# Patient Record
Sex: MALE | Race: WHITE
[De-identification: names, ages, dates, MRNs, and addresses within clinical notes are randomized per-mention and may not be internally consistent; named-entity substitution may affect disease eponyms.]

---

## 2017-12-20 ENCOUNTER — HOSPITAL ENCOUNTER (EMERGENCY)
Dept: HOSPITAL 74 - JER | Age: 57
Discharge: HOME | End: 2017-12-20
Payer: COMMERCIAL

## 2017-12-20 VITALS — SYSTOLIC BLOOD PRESSURE: 133 MMHG | DIASTOLIC BLOOD PRESSURE: 78 MMHG | HEART RATE: 80 BPM | TEMPERATURE: 98 F

## 2017-12-20 VITALS — BODY MASS INDEX: 23 KG/M2

## 2017-12-20 DIAGNOSIS — K21.9: ICD-10-CM

## 2017-12-20 DIAGNOSIS — F13.20: ICD-10-CM
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
## 2017-12-20 DIAGNOSIS — B19.20: ICD-10-CM
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
## 2017-12-20 DIAGNOSIS — S01.112A: Primary | ICD-10-CM

## 2017-12-20 DIAGNOSIS — S09.90XA: ICD-10-CM

## 2017-12-20 DIAGNOSIS — F11.20: ICD-10-CM

## 2017-12-20 PROCEDURE — 3E0234Z INTRODUCTION OF SERUM, TOXOID AND VACCINE INTO MUSCLE, PERCUTANEOUS APPROACH: ICD-10-PCS

## 2017-12-20 NOTE — PDOC
History of Present Illness





<Felisa Infante - Last Filed: 12/20/17 11:12>





- General


History Source: Patient


Exam Limitations: No Limitations





- History of Present Illness


Initial Comments: 


12/20/17 08:53


The patient is a 57 year old male, with a significant past medical history of 

Hep C, heroin use, emphysema, GERD, who presents to the emergency department s/

p fall today. Pt was leaving the methadone clinic and fell outside, sustaining 

a laceration to his left eyebrow and to the the lateral aspect of the left-side 

of his face. Pt is unsure when he received his last tetanus. He denies any 

blurred vision. He denies neck or back pain. Pt reports that he has been on 

methadone for 2 years for heroin addiction. He denies any heroin use today or 

alcohol use. 





Denies having any other injuries or symptoms.








<Vianey Zarco - Last Filed: 12/20/17 11:16>





- General


Chief Complaint: Injury


Stated Complaint: FALL


Time Seen by Provider: 12/20/17 07:48





Past History





- Past Medical History


Anemia: No


Asthma: No


Cancer: No


Cardiac Disorders: No


CVA: No


COPD: No


CHF: No


Dementia: No


Diabetes: No


GI Disorders: No


 Disorders: No


HTN: No


Hypercholesterolemia: No


Kidney Stones: No


Liver Disease: No


Seizures: No


Thyroid Disease: No





- Surgical History


Abdominal Surgery: No


Appendectomy: No


Cardiac Surgery: No


Cholecystectomy: No


Lung Surgery: No


Neurologic Surgery: No


Orthopedic Surgery: Yes (sugery for fx of left clavicle in 1999)





- Reproductive History


Testicular Surgery: No





- Suicide/Smoking/Psychosocial Hx


Smoking History: Current every day smoker


Have you smoked in the past 12 months: Yes


Number of Cigarettes Smoked Daily: 30


Cigars Per Day: 0


Information on smoking cessation initiated: No


'Breaking Loose' booklet given: 09/09/16


Hx Alcohol Use: No


Drug/Substance Use Hx: No (on methadone program)


Substance Use Type: Heroin


Hx Substance Use Treatment: Yes (daily use)





<Felisa Infante - Last Filed: 12/20/17 11:12>





<Vianey Zarco - Last Filed: 12/20/17 11:16>





- Past Medical History


Allergies/Adverse Reactions: 


 Allergies











Allergy/AdvReac Type Severity Reaction Status Date / Time


 


No Known Allergies Allergy   Verified 12/20/17 07:44











Home Medications: 


Ambulatory Orders





Methadone [Dolophine -] 50 mg PO DAILY 12/07/16 


Citalopram Hydrobromide [Celexa -] 10 mg PO DAILY 12/20/17 


Gabapentin [Neurontin -] 200 mg PO HS 12/20/17 


Trazodone HCl [Desyrel -] 100 mg PO HS 12/20/17 











**Review of Systems





- Review of Systems


Able to Perform ROS?: Yes


Comments:: 


12/20/17 08:54


GENERAL/CONSTITUTIONAL: No fever or chills. No weakness.


HEAD, EYES, EARS, NOSE AND THROAT: No change in vision. No ear pain or 

discharge. No sore throat.


CARDIOVASCULAR: No chest pain or shortness of breath.


RESPIRATORY: No cough, wheezing, or hemoptysis.


GASTROINTESTINAL: No nausea, vomiting, diarrhea or constipation.


GENITOURINARY: No dysuria, frequency, or change in urination.


MUSCULOSKELETAL: No joint or muscle swelling or pain. No neck or back pain.


SKIN: +laceration to left eyebrow and to the lateral aspect of the left-side of 

his face.


NEUROLOGIC: No headache, vertigo, loss of consciousness, or change in strength/

sensation.


ENDOCRINE: No increased thirst. No abnormal weight change.


HEMATOLOGIC/LYMPHATIC: No anemia, easy bleeding, or history of blood clots.


ALLERGIC/IMMUNOLOGIC: No hives or skin allergy.


    








<Vianey Zarco - Last Filed: 12/20/17 11:16>





*Physical Exam





- Vital Signs


 Last Vital Signs











Temp Pulse Resp BP Pulse Ox


 


 98.4 F   102 H  20   148/80   97 


 


 12/20/17 07:44  12/20/17 07:44  12/20/17 07:44  12/20/17 07:44  12/20/17 07:44














<Felisa Infante - Last Filed: 12/20/17 11:12>





- Vital Signs


 Last Vital Signs











Temp Pulse Resp BP Pulse Ox


 


 98.4 F   102 H  20   148/80   97 


 


 12/20/17 07:44  12/20/17 07:44  12/20/17 07:44  12/20/17 07:44  12/20/17 07:44














- Physical Exam


Comments: 


12/20/17 08:54


GENERAL: Awake, alert, and fully oriented, in no acute distress


HEAD: No signs of trauma


EYES: PERRLA, EOMI, sclera anicteric, conjunctiva clear


ENT: +Poor dentition but no broken teeth. Auricles normal inspection, hearing 

grossly normal, nares patent, oropharynx clear without 


exudates. Moist mucosa


NECK: Normal ROM, supple, no lymphadenopathy, JVD, or masses. No C spine 

tenderness. 


LUNGS: Breath sounds equal, clear to auscultation bilaterally.  No wheezes, and 

no crackles


HEART: Regular rate and rhythm, normal S1 and S2, no murmurs, rubs or gallops


ABDOMEN: Soft, nontender, normoactive bowel sounds.  No guarding, no rebound.  

No masses


EXTREMITIES: Normal range of motion, no edema.  No clubbing or cyanosis. No 

cords, erythema, or 


tenderness


NEUROLOGICAL: Cranial nerves II through XII grossly intact.  Normal speech, 

normal gait


SKIN: (+)Large laceration to the left eyebrow and to the lateral aspect of the 

left-side of his face, abrasion to LT side of cheek. No other external 

deformities other than his face. Warm, Dry, normal turgor.








<Vianey Zarco - Last Filed: 12/20/17 11:16>





**Heart Score/ECG Review





- ECG Intrepretation


Comment:: 





12/20/17 09:56


sinus at 69, L axis, RBBB, no acute st/t wave findings





<Felisa Infante - Last Filed: 12/20/17 11:12>





Medical Decision Making





- Medical Decision Making





12/20/17 09:40


56yo male s/p fall outside the methadone clinic


-closed head injury


-eye lacs


-will update tetanus


-never received narcan from EMS or the methadone facility


-will obtain head ct, c spine ct, facial ct





12/20/17 09:56


cts negative


now c/o chest wall pain


will check ekg and cxr





hx of benzo abuse - requesting detox. 


12/20/17 11:12


case discussed with Dr. Castle who accepts pt to detox for benzo use. pt 

agreeable to go to detox. 





<Felisa Infante - Last Filed: 12/20/17 11:12>





*DC/Admit/Observation/Transfer





- Discharge Dispostion


Admit: No





- Attestations


Physician Attestion: 





12/20/17 11:15








I, Dr. Felisa Infante DO, attest that this document has been prepared under 

my direction and personally reviewed by me in its entirety.   I further attest, 

that it accurately reflects all work, treatment, procedures and medical decision

-making performed by me.  





<Felisa Infante - Last Filed: 12/20/17 11:12>





- Attestations


Scribe Attestion: 


12/20/17 11:16





Documentation prepared by Vianey Zarco, acting as medical scribe for Felisa Infante DO, MD.





<Vianey Zarco - Last Filed: 12/20/17 11:16>


Diagnosis at time of Disposition: 


 Closed head injury, Facial laceration, Opioid dependence, Benzodiazepine 

dependence








- Discharge Dispostion


Disposition: HOME


Condition at time of disposition: Stable





- Referrals


Referrals: 


Marco Anglin MD [Staff Physician] - 





- Patient Instructions


Printed Discharge Instructions:  DI for Laceration Repair Steri-Strips, DI for 

Laceration Repair -- Simple, DI for Closed Head Injury


Additional Instructions: 


Please go directly to [facility] for detox. Security will take you. Please apply 

local wound care to the facial lacerations. Please return to the ED with any 

further concerns.

## 2017-12-20 NOTE — EKG
Test Reason : 

Blood Pressure : ***/*** mmHG

Vent. Rate : 069 BPM     Atrial Rate : 069 BPM

   P-R Int : 174 ms          QRS Dur : 136 ms

    QT Int : 422 ms       P-R-T Axes : 062 -56 029 degrees

   QTc Int : 452 ms

 

NORMAL SINUS RHYTHM

LEFT AXIS DEVIATION

RIGHT BUNDLE BRANCH BLOCK

ABNORMAL ECG

WHEN COMPARED WITH ECG OF 07-SEP-2017 06:59,

NO SIGNIFICANT CHANGE WAS FOUND

Confirmed by JERSEY STRONG, CLINT ([id number]) on 12/20/2017 11:06:45 AM

 

Referred By:             Confirmed By:CLINT PUTNAM MD

## 2017-12-21 ENCOUNTER — HOSPITAL ENCOUNTER (INPATIENT)
Dept: HOSPITAL 74 - YASAS | Age: 57
LOS: 1 days | Discharge: HOME | DRG: 897 | End: 2017-12-22
Attending: INTERNAL MEDICINE | Admitting: INTERNAL MEDICINE
Payer: COMMERCIAL

## 2017-12-21 VITALS — BODY MASS INDEX: 23.6 KG/M2

## 2017-12-21 DIAGNOSIS — Z91.19: ICD-10-CM

## 2017-12-21 DIAGNOSIS — F32.9: ICD-10-CM

## 2017-12-21 DIAGNOSIS — S01.81XS: ICD-10-CM

## 2017-12-21 DIAGNOSIS — Z99.89: ICD-10-CM

## 2017-12-21 DIAGNOSIS — R26.2: ICD-10-CM

## 2017-12-21 DIAGNOSIS — G40.909: ICD-10-CM
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
## 2017-12-21 DIAGNOSIS — L30.9: ICD-10-CM

## 2017-12-21 DIAGNOSIS — G56.21: ICD-10-CM

## 2017-12-21 DIAGNOSIS — K02.9: ICD-10-CM

## 2017-12-21 DIAGNOSIS — F11.20: ICD-10-CM

## 2017-12-21 DIAGNOSIS — F91.8: ICD-10-CM

## 2017-12-21 DIAGNOSIS — B18.2: ICD-10-CM

## 2017-12-21 DIAGNOSIS — J43.0: ICD-10-CM

## 2017-12-21 DIAGNOSIS — S09.90XS: ICD-10-CM

## 2017-12-21 DIAGNOSIS — F17.210: ICD-10-CM

## 2017-12-21 DIAGNOSIS — J45.32: ICD-10-CM

## 2017-12-21 DIAGNOSIS — F13.230: Primary | ICD-10-CM

## 2017-12-21 DIAGNOSIS — S70.02XS: ICD-10-CM

## 2017-12-21 DIAGNOSIS — I45.10: ICD-10-CM

## 2017-12-21 DIAGNOSIS — F19.24: ICD-10-CM

## 2017-12-21 DIAGNOSIS — F41.9: ICD-10-CM

## 2017-12-21 DIAGNOSIS — K21.9: ICD-10-CM

## 2017-12-21 LAB
ALBUMIN SERPL-MCNC: 4 G/DL (ref 3.4–5)
ALP SERPL-CCNC: 118 U/L (ref 45–117)
ALT SERPL-CCNC: 17 U/L (ref 12–78)
ANION GAP SERPL CALC-SCNC: 9 MMOL/L (ref 8–16)
APPEARANCE UR: (no result)
AST SERPL-CCNC: 20 U/L (ref 15–37)
BILIRUB SERPL-MCNC: 0.7 MG/DL (ref 0.2–1)
BILIRUB UR STRIP.AUTO-MCNC: NEGATIVE MG/DL
CALCIUM SERPL-MCNC: 9.4 MG/DL (ref 8.5–10.1)
CO2 SERPL-SCNC: 27 MMOL/L (ref 21–32)
COLOR UR: (no result)
CREAT SERPL-MCNC: 0.9 MG/DL (ref 0.7–1.3)
DEPRECATED RDW RBC AUTO: 15.9 % (ref 11.9–15.9)
GLUCOSE SERPL-MCNC: 145 MG/DL (ref 74–106)
HYALINE CASTS URNS QL MICRO: 3 /LPF
KETONES UR QL STRIP: (no result)
LEUKOCYTE ESTERASE UR QL STRIP.AUTO: NEGATIVE
MCH RBC QN AUTO: 25.1 PG (ref 25.7–33.7)
MCHC RBC AUTO-ENTMCNC: 32.1 G/DL (ref 32–35.9)
MCV RBC: 78.4 FL (ref 80–96)
MUCOUS THREADS URNS QL MICRO: (no result)
NITRITE UR QL STRIP: NEGATIVE
PH UR: 5 [PH] (ref 5–8)
PLATELET # BLD AUTO: 287 K/MM3 (ref 134–434)
PMV BLD: 8.9 FL (ref 7.5–11.1)
PROT SERPL-MCNC: 8.8 G/DL (ref 6.4–8.2)
PROT UR QL STRIP: (no result)
PROT UR QL STRIP: NEGATIVE
RBC # UR STRIP: NEGATIVE /UL
RBC #/AREA URNS HPF: 1 /HPF (ref 0–3)
SP GR UR: 1.02 (ref 1–1.03)
UROBILINOGEN UR STRIP-MCNC: NEGATIVE MG/DL (ref 0.2–1)
WBC # BLD AUTO: 12.9 K/MM3 (ref 4–10)
WBC #/AREA URNS HPF: 4 /HPF (ref 3–5)

## 2017-12-21 PROCEDURE — HZ2ZZZZ DETOXIFICATION SERVICES FOR SUBSTANCE ABUSE TREATMENT: ICD-10-PCS | Performed by: INTERNAL MEDICINE

## 2017-12-21 RX ADMIN — SULFAMETHOXAZOLE AND TRIMETHOPRIM SCH EACH: 800; 160 TABLET ORAL at 22:49

## 2017-12-21 RX ADMIN — SULFAMETHOXAZOLE AND TRIMETHOPRIM SCH EACH: 800; 160 TABLET ORAL at 14:48

## 2017-12-21 RX ADMIN — BACITRACIN ZINC SCH GM: 500 OINTMENT TOPICAL at 22:49

## 2017-12-21 RX ADMIN — BUDESONIDE AND FORMOTEROL FUMARATE DIHYDRATE SCH: 160; 4.5 AEROSOL RESPIRATORY (INHALATION) at 22:57

## 2017-12-21 RX ADMIN — NICOTINE SCH MG: 21 PATCH TRANSDERMAL at 12:41

## 2017-12-21 RX ADMIN — BACITRACIN ZINC SCH GM: 500 OINTMENT TOPICAL at 14:48

## 2017-12-21 NOTE — EKG
Test Reason : 

Blood Pressure : ***/*** mmHG

Vent. Rate : 082 BPM     Atrial Rate : 082 BPM

   P-R Int : 168 ms          QRS Dur : 142 ms

    QT Int : 428 ms       P-R-T Axes : 062 -51 028 degrees

   QTc Int : 500 ms

 

NORMAL SINUS RHYTHM

LEFT AXIS DEVIATION

RIGHT BUNDLE BRANCH BLOCK

INFERIOR INFARCT , AGE UNDETERMINED

ABNORMAL ECG

WHEN COMPARED WITH ECG OF 20-DEC-2017 09:41,

INFERIOR INFARCT IS NOW PRESENT

Confirmed by DYLAN STRONG, KAREN ([id number]) on 12/21/2017 4:33:41 PM

 

Referred By: SIDNEY HARTLEY           Confirmed By:KAREN RICHARDSON MD

## 2017-12-21 NOTE — HP
CIWA Score





- CIWA Score


Nausea/Vomitin-Mild Nausea/No Vomiting


Muscle Tremors: 4-Moderate,w/Arms Extend


Anxiety: 4-Mod. Anxious/Guarded


Agitation: 3


Paroxysmal Sweats: 2


Orientation: 2-Disoriented Date<2 days


Tacttile Disturbances: 2-Mild Itch/Numbness/Burn


Auditory Disturbances: 0-None


Visual Disturbances: 1-Very Mild Sensitivity


Headache: 2-Mild


CIWA-Ar Total Score: 21





Admission ROS S





- HPI


Chief Complaint: 





"I am spending too much money on my habit and I need to stop.'


Patient is here to Detox from Xanax.


Allergies/Adverse Reactions: 


 Allergies











Allergy/AdvReac Type Severity Reaction Status Date / Time


 


No Known Allergies Allergy   Verified 17 09:09











History of Present Illness: 





Patient is a 56 YO male here to Detox from Xanax. Patient has had several 

previous Detox admissions at [facility] in the past (last: ). Patient is a client 

at [facility] ([facility]): Daily Dose: 50 mg, Last Day Medicated: 

Today, 2017 - Verified.


Exam Limitations: No Limitations





- Ebola screening


Have you traveled outside of the country in the last 21 days: No (N)


Have you had contact with anyone from an Ebola affected area: No


Have you been sick,other than usual withdrawal symptoms: No


Do you have a fever: No





- Review of Systems


Constitutional: Chills, Diaphoresis, Fever, Loss of Appetite, Malaise, Night 

Sweats, Changes in sleep, Unintentional Wgt. Loss (Lost approx. 20 lbs. over 

last 1 year.)


EENT: reports: Blurred Vision (Near-sighted.), Hearing Loss, Tinnitus, Dental 

Problems (Cavities.)


Respiratory: reports: SOB with Exertion


Cardiac: reports: Palpitations


GI: reports: Nausea, Poor Appetite, Indigestion (Including Heartburn.)


: reports: No Symptoms Reported


Musculoskeletal: reports: Back Pain, Joint Pain, Muscle Pain, Neck Pain, Joint 

Stiffness


Integumentary: reports: Bruising (Area Surrounding Left Eye and Orbit. Patient 

unclear as to how injury occurred. However, patient's Methadone Counselor 

reports that patient had a seizure at [facility] yesterday and subsequently 

fell and hit head.), Erythema (Area Surrounding Left Eye and Orbit.)


Neuro: reports: Headache, Tremors


Endocrine: reports: No Symptoms Reported


Hematology: reports: No Symptoms Reported


Psychiatric: reports: Judgement Intact, Mood/Affect Appropiate, Anxious, 

Depressed (Took meds. in past. None currently.), Disorientated (To Current Day 

/ Date.)


Other Systems: Reviewed and Negative





Patient History





- Patient Medical History


Hx Anemia: No


Hx Asthma: No


Hx Chronic Obstructive Pulmonary Disease (COPD): Yes (Uncertain about type.)


Hx Cancer: No


Hx Cardiac Disorders: No


Hx Congestive Heart Failure: No


Hx Hypertension: No


Hx Hypercholesterolemia: No


Hx Pacemaker: No


HX Cerebrovascular Accident: No


Hx Seizures: Yes (2017)


Hx Dementia: No


Hx Diabetes: No


Hx Gastrointestinal Disorders: No


Hx Liver Disease: No


Hx Genitourinary Disorders: No


Hx Sexually Transmitted Disorders: No


Hx Renal Disease (ESRD): No


Hx Thyroid Disease: No


Hx Human Immunodeficiency Virus (HIV): No (Negative History.)


Hx Hepatitis C: Yes (Completed Treatment with last 2 years. Undetectable Viral 

Load.)


Hx Depression: Yes (Meds. in past, none currently.)


Hx Suicide Attempt: No (PATIENT DENIES CURRENT SI / HI.)


Hx Bipolar Disorder: No


Hx Schizophrenia: No


Other Medical History: DENIES.





- Patient Surgical History


Past Surgical History: Yes


Hx Neurologic Surgery: No


Hx Cataract Extraction: No


Hx Cardiac Surgery: No


Hx Lung Surgery: No


Hx Breast Surgery: No


Hx Breast Biopsy: No


Hx Abdominal Surgery: No


Hx Appendectomy: No


Hx Cholecystectomy: No


Hx Genitourinary Surgery: No


Hx  Section: No


Hx Orthopedic Surgery: Yes (sugery for fx of left clavicle in )


Other Surgical History: fx lt ankle-(fall); Six subsequent surgeries.


Anesthesia Reaction: No





- PPD History


Previous Implant?: Yes


Documented Results: Negative w/o proof


Implanted On Prior [facility] Admission?: Yes


Date: 08/22/15


Results: 0 mm


PPD to be Administered?: Yes





- Reproductive History


Patient is a Female of Child Bearing Age (11 -55 yrs old): No (PATIENT IS MALE.)





- Smoking Cessation


Smoking history: Current every day smoker


Have you smoked in the past 12 months: Yes


Aproximately how many cigarettes per day: 20


Cigars Per Day: 0


Hx Chewing Tobacco Use: No


Initiated information on smoking cessation: Yes


'Breaking Loose' booklet given: 17 (GIVEN ON UNIT.)





- Substance & Tx. History


Hx Alcohol Use: No


Hx Substance Use: Yes


Substance Use Type: Heroin, Opiates, Tranquilizers


Hx Substance Use Treatment: Yes (Previous Detox/Rehab admissions at [facility] (Last: 

).)





- Substances Abused


  ** Alprazolam (Xanax)


Route: Injection ("Skin-Popping")


Frequency: Daily


Amount used: 7-10 bags


Age of first use: 44


Date of Last Use: 17





  ** Heroin


Route: Injection


Frequency: Daily


Amount used: 1 gram


Age of first use: 53


Date of Last Use: 17





Family Disease History





- Family Disease History


Family Disease History: Other: Brother (dep on alcohol and heroin ), Son (

Committed Suicide.)





Admission Physical Exam [facility]





- Vital Signs


Vital Signs: 


 Vital Signs - 24 hr











  17





  09:17


 


Temperature 97.7 F


 


Pulse Rate 95 H


 


Respiratory 18





Rate 


 


Blood Pressure 141/90














- Physical


General Appearance: Yes: Nourished, Appropriately Dressed, Mild Distress, 

Tremorous, Anxious, Other (Patient ambulates with assistance of a cane.)


HEENTM: Yes: Hearing grossly Normal, Normocephalic, Normal Voice, ANJU, Pharynx 

Normal, Orbits (Erythema, swelling, stitches noted around Left Orbit. Patient 

evaluated in [facility] ER yesterday after fall due to seizure. No bleeding or 

unusual discahrge noted.)


Respiratory: Yes: Chest Non-Tender, Lungs Clear, No Respiratory Distress, No 

Accessory Muscle Use


Neck: Yes: No masses,lesions,Nodules, Supple, Trachea in good position


Breast: Yes: Breast Exam Deferred


Cardiology: Yes: Regular Rhythm, Regular Rate, S1, S2


Abdominal: Yes: Normal Bowel Sounds, Flat, Soft


Genitourinary: Yes: Within Normal Limits


Back: Yes: Decreased Range of Motion


Musculoskeletal: Yes: Gait Steady (With assistance of a cane.), Back pain, 

Joint Stiffness, Muscle Pain


Extremities: Yes: Tremors


Neurological: Yes: Alert, Normal Mood/Affect, Normal Response, Disoriented (To 

Current Day / Date.)


Integumentary: Yes: Normal Color, Dry, Warm, Track Marks (Noted on bilateral 

arms, erythema noted at affected sites.)


Lymphatic: Yes: Within Normal Limits





- Diagnostic


(1) Sedative, hypnotic or anxiolytic dependence with withdrawal, uncomplicated


Current Visit: Yes   Status: Acute   





(2) Opioid dependence on agonist therapy


Current Visit: Yes   Status: Chronic   





(3) Closed head injury


Current Visit: Yes   Status: Acute   


Qualifiers: 


   Encounter type: sequela   Qualified Code(s): S09.90XS - Unspecified injury 

of head, sequela   





(4) Facial laceration


Current Visit: Yes   Status: Acute   


Qualifiers: 


   Encounter type: sequela   Qualified Code(s): S01.81XS - Laceration without 

foreign body of other part of head, sequela   





(5) Methadone maintenance therapy patient


Current Visit: Yes   Status: Chronic   





(6) Hepatitis C


Current Visit: Yes   Status: Resolved   


Qualifiers: 


   Viral hepatitis chronicity: chronic   Hepatic coma status: without hepatic 

coma   Qualified Code(s): B18.2 - Chronic viral hepatitis C   





(7) Nicotine dependence


Current Visit: Yes   Status: Chronic   


Qualifiers: 


   Nicotine product type: cigarettes   Substance use status: uncomplicated   

Qualified Code(s): F17.210 - Nicotine dependence, cigarettes, uncomplicated   


Comment: Pt is not willing to stop or reduce tobacco use at this time   





Cleared for Admission [facility]





- Detox or Rehab


[facility] Level of Care: Medically Managed


Detox Regimen/Protocol: Valium





[facility] Breath Alcohol Content


Breath Alcohol Content: 0





Urine Drug Screen





- Results


Drug Screen Negative: No


Urine Drug Screen Results: OPI-Opiates, BZO-Benzodiazepines, MTD-Methadone, OXY-

Oxycodone

## 2017-12-21 NOTE — CONSULT
[facility] Psychiatric Consult





- Data


Date of interview: 12/21/17


Admission source: [facility]


Identifying data: This is 57 years old male with unclear psychiatric 

hospitalization history, intoxicated with:  Opioids, Methadome,. Xanax, Nicotine


Substance Abuse History: Smoking history: Current every day smoker.  Have you 

smoked in the past 12 months: Yes.  Aproximately how many cigarettes per day: 

20.  Cigars Per Day: 0.  Hx Chewing Tobacco Use: No.  Initiated information on 

smoking cessation: Yes.  'Breaking Loose' booklet given: 12/21/17 (GIVEN ON 

UNIT.).  - Substance & Tx. History.  Hx Alcohol Use: No.  Hx Substance Use: 

Yes.  Substance Use Type: Heroin, Opiates, Tranquilizers.  Hx Substance Use 

Treatment: Yes (Previous Detox/Rehab admissions at [facility] (Last: 2015).).  - 

Substances Abused.  ** Alprazolam (Xanax).  Route: Injection ("Skin-Popping").  

Frequency: Daily.  Amount used: 7-10 bags.  Age of first use: 44.  Date of Last 

Use: 12/20/17.  ** Heroin.  Route: Injection.  Frequency: Daily.  Amount used: 

1 gram.  Age of first use: 53.  Date of Last Use: 12/21/17


Medical History: [facility] 50mg /day, Head injury history, HepC+, facial lacerations

, left orbital hematoma, COPD, Emphusema, RBBB


Psychiatric History: Patient reports hisatory of depression, reports tasking 

prior to admission:  Celexa 10mg poqd


Physical/Sexual Abuse/Trauma History: Denies


Additional Comment: Celexa 10mg poqd





Mental Status Exam





- Mental Status Exam


Alert and Oriented to: Person


Cognitive Function: Fair


Patient Appearance: Unkempt


Mood: Anxious, Happy


Affect: Inappropriate


Patient Behavior: Cooperative


Speech Pattern: Excessive


Voice Loudness: Mildly Loud


Thought Process: Circumstantial


Thought Disorder: Being Controlled


Hallucinations: Denies


Suicidal Ideation: Denies


Homicidal Ideation: Denies


Insight/Judgement: Fair


Sleep: Fair


Appetite: Weight loss


Muscle strength/Tone: Normal


Gait/Station: Shuffling


Additional Comments: Celexa 10mg poqd





Psychiatric Findings





- Problem List (Axis 1, 2,3)


(1) Sedative, hypnotic or anxiolytic dependence with withdrawal, uncomplicated


Current Visit: Yes   Status: Acute   





(2) Methadone maintenance therapy patient


Current Visit: Yes   Status: Chronic   





(3) Nicotine dependence


Current Visit: Yes   Status: Chronic   


Qualifiers: 


   Nicotine product type: cigarettes   Substance use status: uncomplicated   

Qualified Code(s): F17.210 - Nicotine dependence, cigarettes, uncomplicated   


Comment: Pt is not willing to stop or reduce tobacco use at this time   





(4) Opioid dependence on agonist therapy


Current Visit: Yes   Status: Chronic   





(5) Benzodiazepine dependence


Current Visit: No   Status: Chronic   





(6) Substance induced mood disorder


Current Visit: No   Status: Chronic   Comment: Pt admits he should enter long-

term drug rehabilitation but he first needs to "take care of thingst"   





(7) Tobacco abuse


Current Visit: No   Status: Chronic   Comment: Discussed need to reduce tobacco 

use due to pneumonia and emphysema.  Pt admits need to stop but wants to wait. 

  





- Initial Treatment Plan


Initial Treatment Plan: Celexa 10mg poqd

## 2017-12-22 VITALS — SYSTOLIC BLOOD PRESSURE: 111 MMHG | HEART RATE: 95 BPM | TEMPERATURE: 98.1 F | DIASTOLIC BLOOD PRESSURE: 77 MMHG

## 2017-12-22 LAB
HIV 1 & 2 AB: NEGATIVE
HIV 1 AGP24: NEGATIVE

## 2017-12-22 RX ADMIN — IBUPROFEN PRN MG: 400 TABLET, FILM COATED ORAL at 10:17

## 2017-12-22 RX ADMIN — SULFAMETHOXAZOLE AND TRIMETHOPRIM SCH EACH: 800; 160 TABLET ORAL at 10:20

## 2017-12-22 RX ADMIN — NICOTINE SCH MG: 21 PATCH TRANSDERMAL at 10:18

## 2017-12-22 RX ADMIN — BUDESONIDE AND FORMOTEROL FUMARATE DIHYDRATE SCH INHALER: 160; 4.5 AEROSOL RESPIRATORY (INHALATION) at 10:18

## 2017-12-22 RX ADMIN — IBUPROFEN PRN MG: 400 TABLET, FILM COATED ORAL at 02:33

## 2017-12-22 RX ADMIN — BACITRACIN ZINC SCH GM: 500 OINTMENT TOPICAL at 10:18

## 2017-12-22 NOTE — DS
[facility] Detox Discharge Summary


Admission Date: 


12/21/17








- History


Present History: Sedative Dependence, [facility]





- Physical Exam Results


Vital Signs: 


 Vital Signs











Temperature  98.1 F   12/22/17 09:57


 


Pulse Rate  95 H  12/22/17 09:57


 


Respiratory Rate  20   12/22/17 09:57


 


Blood Pressure  111/77   12/22/17 09:57


 


O2 Sat by Pulse Oximetry (%)      














- Treatment


Hospital Course: Detox Protocol Followed, Detoxed Safely, Responded well, 

Discharged Condition Good, Rehab Referral Accepted





- Medication


Discharge Medications: 


Ambulatory Orders





Methadone [Dolophine -] 50 mg PO DAILY 12/07/16 


Citalopram Hydrobromide [Celexa -] 10 mg PO DAILY 12/20/17 


Gabapentin [Neurontin -] 200 mg PO HS 12/20/17 


Budesonide/Formeterol Fumarate [SYMBICORT 160/4.5mcg -] 1 inh PO BID 12/21/17 


Citalopram Hydrobromide [Celexa -] 10 mg PO DAILY #30 tablet 12/21/17 











- Diagnosis


(1) Closed head injury


Current Visit: Yes   Status: Acute   


Qualifiers: 


   Encounter type: initial encounter   Qualified Code(s): S09.90XA - 

Unspecified injury of head, initial encounter   





(2) Facial laceration


Current Visit: Yes   Status: Acute   


Qualifiers: 


   Encounter type: initial encounter   Qualified Code(s): S01.81XA - Laceration 

without foreign body of other part of head, initial encounter   





(3) Sedative, hypnotic or anxiolytic dependence with withdrawal, uncomplicated


Current Visit: Yes   Status: Chronic   





(4) Methadone maintenance therapy patient


Current Visit: Yes   Status: Chronic   





(5) Nicotine dependence


Current Visit: Yes   Status: Chronic   


Qualifiers: 


   Nicotine product type: cigarettes   Substance use status: uncomplicated   

Qualified Code(s): F17.210 - Nicotine dependence, cigarettes, uncomplicated   





(6) Opioid dependence on agonist therapy


Current Visit: Yes   Status: Chronic   





(7) Hepatitis C


Current Visit: Yes   Status: Resolved   


Qualifiers: 


   Viral hepatitis chronicity: chronic   Hepatic coma status: without hepatic 

coma   Qualified Code(s): B18.2 - Chronic viral hepatitis C   





(8) Dermatitis


Current Visit: No   Status: Acute   





(9) Rib contusion


Current Visit: Yes   Status: Chronic   





(10) Right bundle branch block


Current Visit: No   Status: Acute   





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


Current Visit: No   Status: Acute   





(12) Anxiety Disorder NOS


Current Visit: No   Status: Chronic   





(13) Asthma


Current Visit: No   Status: Chronic   


Qualifiers: 


   Asthma severity: mild persistent   Asthma complication type: with status 

asthmaticus   Qualified Code(s): J45.32 - Mild persistent asthma with status 

asthmaticus   





(14) COPD (chronic obstructive pulmonary disease)


Current Visit: No   Status: Chronic   


Qualifiers: 


   COPD type: emphysema   Emphysema type: unilateral   Qualified Code(s): J43.0 

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   





(15) Dental caries extending into dentin


Current Visit: No   Status: Chronic   





(16) Depression


Current Visit: No   Status: Chronic   





(17) GERD (gastroesophageal reflux disease)


Current Visit: No   Status: Chronic   


Qualifiers: 


   Esophagitis presence: without esophagitis   Qualified Code(s): K21.9 - Gastro

-esophageal reflux disease without esophagitis   





(18) Hepatitis C carrier


Current Visit: No   Status: Chronic   





(19) Substance induced mood disorder


Current Visit: No   Status: Chronic   





(20) Emphysema lung


Current Visit: No   Status: Suspected   





- AMA


Did Patient Leave Against Medical Advice: No (non compliant with unit rules/ 

threathening to staff)

## 2017-12-22 NOTE — PN
S CIWA





- CIWA Score


Nausea/Vomitin-No Nausea/No Vomiting


Muscle Tremors: 4-Moderate,w/Arms Extend


Anxiety: 4-Mod. Anxious/Guarded


Agitation: 3


Paroxysmal Sweats: 3


Orientation: 0-Oriented


Tacttile Disturbances: 0-None


Auditory Disturbances: 0-None


Visual Disturbances: 0-None


Headache: 0-None Present


CIWA-Ar Total Score: 14





BHS Progress Note (SOAP)


Subjective: 





anxiety


sweats


shakes


irritable


interrupted sleep


Objective: 





17 10:36


 Vital Signs











Temperature  98.1 F   17 09:57


 


Pulse Rate  95 H  17 09:57


 


Respiratory Rate  20   17 09:57


 


Blood Pressure  111/77   17 09:57


 


O2 Sat by Pulse Oximetry (%)      








 Laboratory Tests











  17





  10:15 12:15 12:15


 


WBC   12.9 H D 


 


RBC   5.48 


 


Hgb   13.8 


 


Hct   43.0 


 


MCV   78.4 L 


 


MCH   25.1 L 


 


MCHC   32.1 


 


RDW   15.9 


 


Plt Count   287  D 


 


MPV   8.9 


 


Sodium    135 L


 


Potassium    3.9


 


Chloride    99


 


Carbon Dioxide    27


 


Anion Gap    9


 


BUN    13


 


Creatinine    0.9


 


Creat Clearance w eGFR    > 60


 


Random Glucose    145 H D


 


Calcium    9.4


 


Total Bilirubin    0.7  D


 


AST    20  D


 


ALT    17  D


 


Alkaline Phosphatase    118 H


 


Total Protein    8.8 H


 


Albumin    4.0  D


 


Urine Color   


 


Urine Appearance   


 


Urine pH   


 


Ur Specific Gravity   


 


Urine Protein   


 


Urine Glucose (UA)   


 


Urine Ketones   


 


Urine Blood   


 


Urine Nitrite   


 


Urine Bilirubin   


 


Urine Urobilinogen   


 


Ur Leukocyte Esterase   


 


Urine WBC (Auto)   


 


Urine RBC (Auto)   


 


Ur Epithelial Cells   


 


Hyaline Casts   


 


Urine Mucus   


 


RPR Titer   


 


HIV 1&2 Antibody Screen  Negative  


 


HIV P24 Antigen  Negative  














  17





  12:15 15:15


 


WBC  


 


RBC  


 


Hgb  


 


Hct  


 


MCV  


 


MCH  


 


MCHC  


 


RDW  


 


Plt Count  


 


MPV  


 


Sodium  


 


Potassium  


 


Chloride  


 


Carbon Dioxide  


 


Anion Gap  


 


BUN  


 


Creatinine  


 


Creat Clearance w eGFR  


 


Random Glucose  


 


Calcium  


 


Total Bilirubin  


 


AST  


 


ALT  


 


Alkaline Phosphatase  


 


Total Protein  


 


Albumin  


 


Urine Color   Shanae


 


Urine Appearance   Cloudy


 


Urine pH   5.0


 


Ur Specific Gravity   1.025


 


Urine Protein   1+ H


 


Urine Glucose (UA)   Negative


 


Urine Ketones   Trace H


 


Urine Blood   Negative


 


Urine Nitrite   Negative


 


Urine Bilirubin   Negative


 


Urine Urobilinogen   Negative


 


Ur Leukocyte Esterase   Negative


 


Urine WBC (Auto)   4


 


Urine RBC (Auto)   1


 


Ur Epithelial Cells   Rare


 


Hyaline Casts   3


 


Urine Mucus   Many


 


RPR Titer  Nonreactive 


 


HIV 1&2 Antibody Screen  


 


HIV P24 Antigen  








aaox3


ambulating


no acute distress


Assessment: 





17 10:37


withdrawal sx


Plan: 





continue detox


increase fluids

## 2017-12-22 NOTE — PN
BHS Progress Note


Note: 


pt refused to comply by the unit rules, was insisting on more medication,pt was 

explained about the regimen but continue to become irrage. 


pt made a gesture with his finger representing a gun to shoot writer and RN, pt 

became irrate and threatening. security was called to the unit to have pt 

removed off the unit.

## 2018-09-11 ENCOUNTER — HOSPITAL ENCOUNTER (INPATIENT)
Dept: HOSPITAL 74 - YASAS | Age: 58
LOS: 4 days | Discharge: HOME | DRG: 897 | End: 2018-09-15
Attending: INTERNAL MEDICINE | Admitting: INTERNAL MEDICINE
Payer: COMMERCIAL

## 2018-09-11 VITALS — BODY MASS INDEX: 21.7 KG/M2

## 2018-09-11 DIAGNOSIS — F13.230: ICD-10-CM

## 2018-09-11 DIAGNOSIS — J45.32: ICD-10-CM

## 2018-09-11 DIAGNOSIS — G40.509: ICD-10-CM

## 2018-09-11 DIAGNOSIS — B18.2: ICD-10-CM

## 2018-09-11 DIAGNOSIS — F11.23: Primary | ICD-10-CM

## 2018-09-11 DIAGNOSIS — F17.213: ICD-10-CM

## 2018-09-11 DIAGNOSIS — K21.9: ICD-10-CM

## 2018-09-11 DIAGNOSIS — F32.9: ICD-10-CM

## 2018-09-11 DIAGNOSIS — F19.24: ICD-10-CM

## 2018-09-11 DIAGNOSIS — J43.0: ICD-10-CM

## 2018-09-11 LAB
APPEARANCE UR: (no result)
BILIRUB UR STRIP.AUTO-MCNC: NEGATIVE MG/DL
CAOX CRY URNS QL MICRO: (no result) /HPF
COLOR UR: YELLOW
KETONES UR QL STRIP: NEGATIVE
LEUKOCYTE ESTERASE UR QL STRIP.AUTO: NEGATIVE
MUCOUS THREADS URNS QL MICRO: (no result)
NITRITE UR QL STRIP: NEGATIVE
PH UR: 5 [PH] (ref 5–8)
PROT UR QL STRIP: NEGATIVE
PROT UR QL STRIP: NEGATIVE
SP GR UR: 1.02 (ref 1–1.03)
UROBILINOGEN UR STRIP-MCNC: NEGATIVE MG/DL (ref 0.2–1)

## 2018-09-11 PROCEDURE — HZ2ZZZZ DETOXIFICATION SERVICES FOR SUBSTANCE ABUSE TREATMENT: ICD-10-PCS | Performed by: INTERNAL MEDICINE

## 2018-09-11 RX ADMIN — NICOTINE SCH MG: 14 PATCH, EXTENDED RELEASE TRANSDERMAL at 21:30

## 2018-09-11 RX ADMIN — Medication SCH MG: at 21:32

## 2018-09-11 RX ADMIN — BUDESONIDE AND FORMOTEROL FUMARATE DIHYDRATE SCH PUFF: 160; 4.5 AEROSOL RESPIRATORY (INHALATION) at 21:31

## 2018-09-11 NOTE — HP
COWS





- Scale


Resting Pulse: 1= [place] 


Sweatin= Chills/Flushing


Restless Observation: 1= Difficult to Sit Still


Pupil Size: 1= Pupils >than Normal


Bone or Joint Aches: 1= Mild Discomfort


Runny Nose/ Eye Tearin= Runny Nose/Eyes


GI Upset > 30mins: 5=Frequent Vomit/Diarrhea


Tremor Observation: 1= Tremor Felt, Not Seen


Yawning Observation: 2= >3x During Session


Anxiety or Irritability: 2=Irritable/Anxious


Goose Flesh Skin: 0=Smooth Skin


COWS Score: 17





Admission ROS S





- HPI


Chief Complaint: 





opioid and benzodiazepine dependence 


Allergies/Adverse Reactions: 


 Allergies











Allergy/AdvReac Type Severity Reaction Status Date / Time


 


No Known Allergies Allergy   Verified 18 10:16











History of Present Illness: 





59 yo male with hx of intravenous heroin, klonopin and nicotine dependence is 

here seeking detox. Last detox [facility] 18 - administratively 

discharge. Patient was attending [facility] at [facility] reports he tried to self detox 

from methadone and opiates, reports his primary care provider started him on 

Buprenorphine on May 2018, reports no adherence with treatment. Reports he 

wanted get clean this past month, and attempted to do it "cold turkey" and was 

seen in the emergency department twice this month with last episode a week ago 

while he was in [place]. PMHX: COPD, asthma, Hep C, depression, anxiety. 

Denies suicidal / homicidal ideation. Reports history of withdrawal seizure 

about six months ago. 


Exam Limitations: No Limitations





- Ebola screening


Have you traveled outside of the country in the last 21 days: No


Have you had contact with anyone from an Ebola affected area: No


Have you been sick,other than usual withdrawal symptoms: No


Do you have a fever: No





- Review of Systems


Constitutional: Chills, Diaphoresis, Changes in sleep


EENT: reports: Blurred Vision (uses glasses), Dental Problems (missing teeth)


Respiratory: reports: SOB with Exertion, Other (SOB when off opioates)


Cardiac: reports: No Symptoms Reported


GI: reports: Diarrhea, Poor Appetite, Poor Fluid Intake, Vomiting, Abdominal 

cramping


: reports: No Symptoms Reported


Musculoskeletal: reports: Back Pain, Joint Pain, Muscle Pain


Integumentary: reports: No Symptoms Reported


Neuro: reports: See HPI, Dizziness


Endocrine: reports: Increased Thirst


Hematology: reports: No Symptoms Reported


Psychiatric: reports: Orientated x3, Anxious, Depressed (son commited sucide 2010)


Other Systems: Reviewed and Negative





Patient History





- Patient Medical History


Hx Anemia: No


Hx Asthma: Yes


Hx Chronic Obstructive Pulmonary Disease (COPD): Yes


Hx Cancer: No


Hx Cardiac Disorders: No


Hx Congestive Heart Failure: No


Hx Hypertension: No


Hx Hypercholesterolemia: No


Hx Pacemaker: No


HX Cerebrovascular Accident: No


Hx Seizures: Yes (drug related seizures-last episode was in 2018)


Hx Dementia: No


Hx Diabetes: No


Hx Gastrointestinal Disorders: No


Hx Liver Disease: No


Hx Genitourinary Disorders: No


Hx Sexually Transmitted Disorders: No


Hx Renal Disease (ESRD): No


Hx Thyroid Disease: No


Hx Human Immunodeficiency Virus (HIV): No (Negative History.)


Hx Hepatitis C: Yes (Completed Treatment with last 2 years. Undetectable Viral 

Load.)


Hx Depression: Yes


Hx Suicide Attempt: No


Hx Bipolar Disorder: No


Hx Schizophrenia: No





- Patient Surgical History


Past Surgical History: Yes


Hx Neurologic Surgery: No


Hx Cataract Extraction: No


Hx Cardiac Surgery: No


Hx Lung Surgery: No


Hx Breast Surgery: No


Hx Breast Biopsy: No


Hx Abdominal Surgery: No


Hx Appendectomy: No


Hx Cholecystectomy: No


Hx Genitourinary Surgery: No


Hx  Section: No


Hx Orthopedic Surgery: Yes (sugery for fx of left clavicle in )


Other Surgical History: fx lt ankle-(fall); Six subsequent surgeries.


Anesthesia Reaction: No





- PPD History


Previous Implant?: Yes


Documented Results: Negative w/proof


Implanted On Prior [facility] Admission?: Yes


Date: 08/22/15


Results: 0 mm


PPD to be Administered?: Yes





- Smoking Cessation


Smoking history: Current every day smoker


Have you smoked in the past 12 months: Yes


Aproximately how many cigarettes per day: 20


Cigars Per Day: 0


Hx Chewing Tobacco Use: No


Initiated information on smoking cessation: Yes


'Breaking Loose' booklet given: 18





- Substance & Tx. History


Hx Alcohol Use: Yes


Hx Substance Use: Yes


Substance Use Type: Heroin, Tranquilizers


Hx Substance Use Treatment: Yes (Last detox [facility] 18 - 

administratively discharge.)





- Substances Abused


  ** Heroin


Route: Injection


Frequency: Daily


Amount used: 2-3 bags


Age of first use: 44


Date of Last Use: 09/10/18





  ** Klonopin


Route: Oral


Frequency: 3-6 times per week


Amount used: 6-8 mg.


Age of first use: 56


Date of Last Use: 09/10/18





Family Disease History





- Family Disease History


Family Disease History: Other: Brother (dep on alcohol and heroin ), Son (

Committed Suicide.)





Admission Physical Exam BHS





- Vital Signs


Vital Signs: 


 Vital Signs - 24 hr











  18





  09:26


 


Temperature 97.4 F L


 


Pulse Rate 87


 


Respiratory 18





Rate 


 


Blood Pressure 115/66














- Physical


General Appearance: Yes: Disheveled, Moderate Distress, Thin, Sweating, Anxious


HEENTM: Yes: EOMI, Normal ENT Inspection, Normocephalic, Normal Voice, ANJU, 

Pharynx Normal, Hearing Decreased (bilateral), Nasal Congestion, Rhinorrhea, 

Other (cheilithis, poor dentition)


Respiratory: Yes: Chest Non-Tender, Lungs Clear, Normal Breath Sounds, No 

Respiratory Distress, No Accessory Muscle Use


Neck: Yes: Within Normal Limits


Breast: Yes: Breast Exam Deferred


Cardiology: Yes: Regular Rhythm, Regular Rate


Abdominal: Yes: Normal Bowel Sounds, Non Tender, Flat, Soft


Genitourinary: Yes: Within Normal Limits


Back: Yes: Normal Inspection


Musculoskeletal: Yes: full range of Motion, Gait Steady, Pelvis Stable


Extremities: Yes: Normal Capillary Refill, Normal Inspection, Normal Range of 

Motion


Neurological: Yes: CNs II-XII NML intact, Fully Oriented, Alert, Motor Strength 

5/5


Integumentary: Yes: Normal Color, Warm, Diaphoresis, Track Marks (bilateral 

forearms no infection)


Lymphatic: Yes: Within Normal Limits





- Diagnostic


(1) Opioid dependence with withdrawal


Current Visit: Yes   Status: Acute   





(2) Depression


Current Visit: Yes   Status: Suspected   


Qualifiers: 


   Depression Type: unspecified   Qualified Code(s): F32.9 - Major depressive 

disorder, single episode, unspecified   





(3) GERD (gastroesophageal reflux disease)


Current Visit: Yes   Status: Chronic   


Qualifiers: 


   Esophagitis presence: without esophagitis   Qualified Code(s): K21.9 - Gastro

-esophageal reflux disease without esophagitis   





(4) Hepatitis C carrier


Current Visit: No   Status: Chronic   Comment: draw labs today, Patient 

education, RTC in 4 weeks   





(5) Nicotine dependence


Current Visit: Yes   Status: Chronic   


Qualifiers: 


   Nicotine product type: cigarettes   Substance use status: uncomplicated   

Qualified Code(s): F17.210 - Nicotine dependence, cigarettes, uncomplicated   


Comment: Pt is not willing to stop or reduce tobacco use at this time   





(6) Sedative, hypnotic or anxiolytic dependence with withdrawal, uncomplicated


Current Visit: Yes   Status: Chronic   





Cleared for Admission [facility]





- Detox or Rehab


[facility] Level of Care: Medically Managed


Detox Regimen/Protocol: Methadone/Valium





[facility] Breath Alcohol Content


Breath Alcohol Content: 0





Urine Drug Screen





- Results


Drug Screen Negative: No


Urine Drug Screen Results: OPI-Opiates, BZO-Benzodiazepines, OXY-Oxycodone

## 2018-09-12 LAB
ALBUMIN SERPL-MCNC: 2.9 G/DL (ref 3.4–5)
ALP SERPL-CCNC: 77 U/L (ref 45–117)
ALT SERPL-CCNC: 18 U/L (ref 12–78)
ANION GAP SERPL CALC-SCNC: 9 MMOL/L (ref 8–16)
AST SERPL-CCNC: 13 U/L (ref 15–37)
BILIRUB SERPL-MCNC: 0.2 MG/DL (ref 0.2–1)
BUN SERPL-MCNC: 11 MG/DL (ref 7–18)
CALCIUM SERPL-MCNC: 7.9 MG/DL (ref 8.5–10.1)
CHLORIDE SERPL-SCNC: 102 MMOL/L (ref 98–107)
CO2 SERPL-SCNC: 28 MMOL/L (ref 21–32)
CREAT SERPL-MCNC: 0.6 MG/DL (ref 0.7–1.3)
DEPRECATED RDW RBC AUTO: 14.8 % (ref 11.9–15.9)
GLUCOSE SERPL-MCNC: 116 MG/DL (ref 74–106)
HCT VFR BLD CALC: 36.2 % (ref 35.4–49)
HGB BLD-MCNC: 12.1 GM/DL (ref 11.7–16.9)
MCH RBC QN AUTO: 27.3 PG (ref 25.7–33.7)
MCHC RBC AUTO-ENTMCNC: 33.4 G/DL (ref 32–35.9)
MCV RBC: 81.9 FL (ref 80–96)
PLATELET # BLD AUTO: 185 K/MM3 (ref 134–434)
PMV BLD: 9.1 FL (ref 7.5–11.1)
POTASSIUM SERPLBLD-SCNC: 3.4 MMOL/L (ref 3.5–5.1)
PROT SERPL-MCNC: 5.6 G/DL (ref 6.4–8.2)
RBC # BLD AUTO: 4.42 M/MM3 (ref 4–5.6)
SODIUM SERPL-SCNC: 139 MMOL/L (ref 136–145)
WBC # BLD AUTO: 8.5 K/MM3 (ref 4–10)

## 2018-09-12 RX ADMIN — POTASSIUM CHLORIDE SCH: 1500 TABLET, EXTENDED RELEASE ORAL at 14:52

## 2018-09-12 RX ADMIN — Medication PRN MG: at 22:27

## 2018-09-12 RX ADMIN — NICOTINE SCH: 14 PATCH, EXTENDED RELEASE TRANSDERMAL at 10:50

## 2018-09-12 RX ADMIN — BUDESONIDE AND FORMOTEROL FUMARATE DIHYDRATE SCH PUFF: 160; 4.5 AEROSOL RESPIRATORY (INHALATION) at 22:28

## 2018-09-12 RX ADMIN — Medication SCH MG: at 22:26

## 2018-09-12 RX ADMIN — Medication SCH TAB: at 10:10

## 2018-09-12 RX ADMIN — NICOTINE SCH MG: 21 PATCH TRANSDERMAL at 10:10

## 2018-09-12 RX ADMIN — BUDESONIDE AND FORMOTEROL FUMARATE DIHYDRATE SCH PUFF: 160; 4.5 AEROSOL RESPIRATORY (INHALATION) at 10:09

## 2018-09-12 NOTE — PN
BHS CIWA





- CIWA Score


Nausea/Vomitin-Mild Nausea/No Vomiting


Muscle Tremors: 2


Anxiety: 3


Agitation: 3


Paroxysmal Sweats: 3


Orientation: 0-Oriented


Tacttile Disturbances: 1-Very Mild Itch/Numbness


Auditory Disturbances: 0-None


Visual Disturbances: 1-Very Mild Sensitivity


Headache: 0-None Present


CIWA-Ar Total Score: 14

## 2018-09-12 NOTE — CONSULT
BHS Psychiatric Consult





- Data


Date of interview: 09/12/18


Admission source: [facility]


Identifying data: Readmission to [facility] for this 57 y/o  male self-

referred for detoxification treatment (opioid,benzodiazepines).Admitted to [address] [place] [address].Patient is ,a father of four,homeless,unemployed (disabled) and 

supported on SSI benefits.


Substance Abuse History: Discussed in this session.Patient confirms an 

extensive history of xanax + heroin abuse.Details in current BHS report : 

Smoking history: Current every day smoker.  Have you smoked in the past 12 

months: Yes.  Aproximately how many cigarettes per day: 20.  Cigars Per Day: 0.

  Hx Chewing Tobacco Use: No.  Initiated information on smoking cessation: Yes.

  'Breaking Loose' booklet given: 09/11/18.  - Substance & Tx. History.  Hx 

Alcohol Use: Yes.  Hx Substance Use: Yes.  Substance Use Type: Heroin, 

Tranquilizers.  Hx Substance Use Treatment: Yes (Last detox [facility] 12/21/18 -12 / 22/18 administratively discharge.).  - Substances Abused.  ** Heroin.  Route: 

Injection.  Frequency: Daily.  Amount used: 2-3 bags.  Age of first use: 44.  

Date of Last Use: 09/10/18.  ** Klonopin.  Route: Oral.  Frequency: 3-6 times 

per week.  Amount used: 6-8 mg.  Age of first use: 56.  Date of Last Use: 09/10/

18


Medical History: Remarkable for Hepatitis C,emphysema,COPD,GERD and 

orthosurgery (fracture of left clavicle + left ankle).


Psychiatric History: Patient denies history of psychiatric hospitalizations or 

suicide attempts.Declares that he sees a psychiatrist at [facility] and the [facility] outpatient 

clinic in the [place] to address depression and anxiety.Used to be prescribed 

celexa + gabapentin + xanax. Mr Seng states that he has stopped taking these 

medications (except for xanax that he purchases in the streets) for the past 

four months.


Physical/Sexual Abuse/Trauma History: Severe history of emotional trauma : one 

son committed suicide in 2010.


Additional Comment: Urine Drug Screen Results: OPI-Opiates, BZO-Benzodiazepines

, OXY-Oxycodone.Noted.





Mental Status Exam





- Mental Status Exam


Alert and Oriented to: Time, Place, Person


Cognitive Function: Grossly Intact


Patient Appearance: Unkempt, Disheveled (unshaven,poor oral hygiene,mising teeth

)


Mood: Angry, Withdrawn, Apprehensive, Irritable


Affect: Mood Congruent


Patient Behavior: Fatigued, Cooperative


Speech Pattern: Clear


Voice Loudness: Normal


Thought Process: Goal Oriented


Thought Disorder: Not Present


Hallucinations: Denies


Suicidal Ideation: Denies


Homicidal Ideation: Denies


Insight/Judgement: Poor


Sleep: Well


Appetite: Fair


Muscle strength/Tone: Normal


Gait/Station: Normal





Psychiatric Findings





- Problem List (Axis 1, 2,3)


(1) Opioid dependence with withdrawal


Current Visit: Yes   Status: Acute   





(2) Sedative, hypnotic or anxiolytic dependence with withdrawal, uncomplicated


Current Visit: Yes   Status: Acute   





(3) Nicotine dependence


Current Visit: Yes   Status: Acute   


Qualifiers: 


   Nicotine product type: cigarettes   Substance use status: in withdrawal   

Qualified Code(s): F17.213 - Nicotine dependence, cigarettes, with withdrawal   


Comment: Pt is not willing to stop or reduce tobacco use at this time   





(4) Substance induced mood disorder


Current Visit: Yes   Status: Chronic   





- Initial Treatment Plan


Initial Treatment Plan: Psychoeducation.Sleep hygiene.Detoxification in 

progress.Referred to counselor for assessment of social needs + arrangements 

for transition to rehabilitation care. Observation.

## 2018-09-12 NOTE — EKG
Test Reason : 

Blood Pressure : ***/*** mmHG

Vent. Rate : 082 BPM     Atrial Rate : 082 BPM

   P-R Int : 162 ms          QRS Dur : 134 ms

    QT Int : 382 ms       P-R-T Axes : 068 -45 063 degrees

   QTc Int : 446 ms

 

NORMAL SINUS RHYTHM

LEFT AXIS DEVIATION

RIGHT BUNDLE BRANCH BLOCK

ABNORMAL ECG

WHEN COMPARED WITH ECG OF 22-DEC-2017 06:54,

NO SIGNIFICANT CHANGE WAS FOUND

Confirmed by JERSEY STRONG, CLINT ([id number]) on 9/12/2018 11:44:48 AM

 

Referred By:             Confirmed By:CLINT PUTNAM MD

## 2018-09-13 RX ADMIN — BUDESONIDE AND FORMOTEROL FUMARATE DIHYDRATE SCH PUFF: 160; 4.5 AEROSOL RESPIRATORY (INHALATION) at 10:19

## 2018-09-13 RX ADMIN — METHADONE HYDROCHLORIDE SCH MG: 5 TABLET ORAL at 10:19

## 2018-09-13 RX ADMIN — NICOTINE SCH: 14 PATCH, EXTENDED RELEASE TRANSDERMAL at 10:20

## 2018-09-13 RX ADMIN — Medication SCH TAB: at 10:19

## 2018-09-13 RX ADMIN — Medication PRN MG: at 22:24

## 2018-09-13 RX ADMIN — NICOTINE SCH MG: 21 PATCH TRANSDERMAL at 10:20

## 2018-09-13 RX ADMIN — Medication SCH MG: at 22:24

## 2018-09-13 RX ADMIN — BUDESONIDE AND FORMOTEROL FUMARATE DIHYDRATE SCH PUFF: 160; 4.5 AEROSOL RESPIRATORY (INHALATION) at 22:26

## 2018-09-13 RX ADMIN — ALUMINUM HYDROXIDE, MAGNESIUM HYDROXIDE, AND SIMETHICONE PRN ML: 200; 200; 20 SUSPENSION ORAL at 00:30

## 2018-09-13 RX ADMIN — POTASSIUM CHLORIDE SCH MEQ: 1500 TABLET, EXTENDED RELEASE ORAL at 10:19

## 2018-09-13 NOTE — PN
[facility] CIWA





- CIWA Score


Nausea/Vomitin-No Nausea/No Vomiting


Muscle Tremors: None


Anxiety: 1-Mildly Anxious


Agitation: 1-Slight > Activity


Paroxysmal Sweats: No Perspiration


Orientation: 0-Oriented


Tacttile Disturbances: 0-None


Auditory Disturbances: 0-None


Visual Disturbances: 4-Moderate Hallucinations


Headache: 0-None Present


CIWA-Ar Total Score: 6





BHS COWS





- Scale


Resting Pulse: 1= [place] 


Sweatin= No chills or Flushing


Restless Observation: 1= Difficult to Sit Still


Pupil Size: 0= Normal to Room Light


Bone or Joint Aches: 0= None


Runny Nose/ Eye Tearin= None


GI Upset > 30mins: 0= None


Tremor Observation of Outstretched Hands: 0= None


Yawning Observation: 0= None


Anxiety or Irritability: 1=Feels Anxious/Irritable


Goose Flesh Skin: 0=Smooth Skin


COWS Score: 3





BHS Progress Note (SOAP)


Subjective: 





Patient c/o anxiety and irritability. Denies CP/SOB and dizziness. 


Objective: 





18 12:45


 Vital Signs











Temperature  97.4 F L  18 09:51


 


Pulse Rate  92 H  18 09:51


 


Respiratory Rate  18   18 09:51


 


Blood Pressure  121/68   18 09:51


 


O2 Sat by Pulse Oximetry (%)      








 Laboratory Tests











  18





  19:30 07:00 07:00


 


WBC   8.5 


 


RBC   4.42 


 


Hgb   12.1 


 


Hct   36.2  D 


 


MCV   81.9 


 


MCH   27.3 


 


MCHC   33.4 


 


RDW   14.8 


 


Plt Count   185  D 


 


MPV   9.1 


 


Sodium    139


 


Potassium    3.4 L


 


Chloride    102


 


Carbon Dioxide    28


 


Anion Gap    9


 


BUN    11


 


Creatinine    0.6 L


 


Creat Clearance w eGFR    > 60


 


Random Glucose    116 H


 


Calcium    7.9 L


 


Total Bilirubin    0.2


 


AST    13 L D


 


ALT    18


 


Alkaline Phosphatase    77


 


Total Protein    5.6 L


 


Albumin    2.9 L


 


Urine Color  Yellow  


 


Urine Appearance  Turbid  


 


Urine pH  5.0  


 


Ur Specific Gravity  1.020  


 


Urine Protein  Negative  


 


Urine Glucose (UA)  Negative  


 


Urine Ketones  Negative  


 


Urine Blood  1+ H  


 


Urine Nitrite  Negative  


 


Urine Bilirubin  Negative  


 


Urine Urobilinogen  Negative  


 


Ur Leukocyte Esterase  Negative  


 


Urine WBC (Auto)  None  


 


Urine RBC (Auto)  None  


 


Calcium Oxalate Crystal  Many  


 


Urine Mucus  Moderate  


 


RPR Titer   














  18





  07:00


 


WBC 


 


RBC 


 


Hgb 


 


Hct 


 


MCV 


 


MCH 


 


MCHC 


 


RDW 


 


Plt Count 


 


MPV 


 


Sodium 


 


Potassium 


 


Chloride 


 


Carbon Dioxide 


 


Anion Gap 


 


BUN 


 


Creatinine 


 


Creat Clearance w eGFR 


 


Random Glucose 


 


Calcium 


 


Total Bilirubin 


 


AST 


 


ALT 


 


Alkaline Phosphatase 


 


Total Protein 


 


Albumin 


 


Urine Color 


 


Urine Appearance 


 


Urine pH 


 


Ur Specific Gravity 


 


Urine Protein 


 


Urine Glucose (UA) 


 


Urine Ketones 


 


Urine Blood 


 


Urine Nitrite 


 


Urine Bilirubin 


 


Urine Urobilinogen 


 


Ur Leukocyte Esterase 


 


Urine WBC (Auto) 


 


Urine RBC (Auto) 


 


Calcium Oxalate Crystal 


 


Urine Mucus 


 


RPR Titer  Nonreactive











Assessment: 





18 12:45


Withdrawal syndrome


K 3.4


18 12:45





Plan: 





Continue detox protocol


repeat BMP in am

## 2018-09-14 LAB
ANION GAP SERPL CALC-SCNC: 11 MMOL/L (ref 8–16)
BUN SERPL-MCNC: 10 MG/DL (ref 7–18)
CALCIUM SERPL-MCNC: 8.4 MG/DL (ref 8.5–10.1)
CHLORIDE SERPL-SCNC: 105 MMOL/L (ref 98–107)
CO2 SERPL-SCNC: 26 MMOL/L (ref 21–32)
CREAT SERPL-MCNC: 0.6 MG/DL (ref 0.55–1.3)
GLUCOSE SERPL-MCNC: 132 MG/DL (ref 74–106)
POTASSIUM SERPLBLD-SCNC: 3.9 MMOL/L (ref 3.5–5.1)
SODIUM SERPL-SCNC: 142 MMOL/L (ref 136–145)

## 2018-09-14 RX ADMIN — BUDESONIDE AND FORMOTEROL FUMARATE DIHYDRATE SCH PUFF: 160; 4.5 AEROSOL RESPIRATORY (INHALATION) at 22:41

## 2018-09-14 RX ADMIN — POTASSIUM CHLORIDE SCH MEQ: 1500 TABLET, EXTENDED RELEASE ORAL at 10:20

## 2018-09-14 RX ADMIN — Medication PRN MG: at 22:39

## 2018-09-14 RX ADMIN — Medication SCH MG: at 22:38

## 2018-09-14 RX ADMIN — Medication SCH TAB: at 10:20

## 2018-09-14 RX ADMIN — METHADONE HYDROCHLORIDE SCH MG: 5 TABLET ORAL at 10:20

## 2018-09-14 RX ADMIN — ALUMINUM HYDROXIDE, MAGNESIUM HYDROXIDE, AND SIMETHICONE PRN ML: 200; 200; 20 SUSPENSION ORAL at 02:19

## 2018-09-14 RX ADMIN — NICOTINE SCH MG: 21 PATCH TRANSDERMAL at 10:20

## 2018-09-14 RX ADMIN — BUDESONIDE AND FORMOTEROL FUMARATE DIHYDRATE SCH PUFF: 160; 4.5 AEROSOL RESPIRATORY (INHALATION) at 10:20

## 2018-09-14 NOTE — PN
BHS Progress Note (SOAP)


Subjective: 





PATIENT ANXIOUS ABOUT DISCHARGE STATUS. DENIES MEDICAL COMPLAINTS


Objective: 





09/14/18 10:08


 Laboratory Tests











  09/11/18 09/12/18 09/12/18





  19:30 07:00 07:00


 


WBC   8.5 


 


RBC   4.42 


 


Hgb   12.1 


 


Hct   36.2  D 


 


MCV   81.9 


 


MCH   27.3 


 


MCHC   33.4 


 


RDW   14.8 


 


Plt Count   185  D 


 


MPV   9.1 


 


Sodium    139


 


Potassium    3.4 L


 


Chloride    102


 


Carbon Dioxide    28


 


Anion Gap    9


 


BUN    11


 


Creatinine    0.6 L


 


Creat Clearance w eGFR    > 60


 


Random Glucose    116 H


 


Calcium    7.9 L


 


Total Bilirubin    0.2


 


AST    13 L D


 


ALT    18


 


Alkaline Phosphatase    77


 


Total Protein    5.6 L


 


Albumin    2.9 L


 


Urine Color  Yellow  


 


Urine Appearance  Turbid  


 


Urine pH  5.0  


 


Ur Specific Gravity  1.020  


 


Urine Protein  Negative  


 


Urine Glucose (UA)  Negative  


 


Urine Ketones  Negative  


 


Urine Blood  1+ H  


 


Urine Nitrite  Negative  


 


Urine Bilirubin  Negative  


 


Urine Urobilinogen  Negative  


 


Ur Leukocyte Esterase  Negative  


 


Urine WBC (Auto)  None  


 


Urine RBC (Auto)  None  


 


Calcium Oxalate Crystal  Many  


 


Urine Mucus  Moderate  


 


RPR Titer   














  09/12/18





  07:00


 


WBC 


 


RBC 


 


Hgb 


 


Hct 


 


MCV 


 


MCH 


 


MCHC 


 


RDW 


 


Plt Count 


 


MPV 


 


Sodium 


 


Potassium 


 


Chloride 


 


Carbon Dioxide 


 


Anion Gap 


 


BUN 


 


Creatinine 


 


Creat Clearance w eGFR 


 


Random Glucose 


 


Calcium 


 


Total Bilirubin 


 


AST 


 


ALT 


 


Alkaline Phosphatase 


 


Total Protein 


 


Albumin 


 


Urine Color 


 


Urine Appearance 


 


Urine pH 


 


Ur Specific Gravity 


 


Urine Protein 


 


Urine Glucose (UA) 


 


Urine Ketones 


 


Urine Blood 


 


Urine Nitrite 


 


Urine Bilirubin 


 


Urine Urobilinogen 


 


Ur Leukocyte Esterase 


 


Urine WBC (Auto) 


 


Urine RBC (Auto) 


 


Calcium Oxalate Crystal 


 


Urine Mucus 


 


RPR Titer  Nonreactive








 Vital Signs











Temperature  97.9 F   09/14/18 09:54


 


Pulse Rate  80   09/14/18 09:54


 


Respiratory Rate  20   09/14/18 09:54


 


Blood Pressure  100/59   09/14/18 09:54


 


O2 Sat by Pulse Oximetry (%)      








PE:





ALERT AND ORIENTED


SKIN WARM AND DRY


CAR S1S2


RESP CTA BL


Plan: 





CONTINUE DETOX AS PER PROTOCOL


ENCOURAGE ORAL FLUIDS


CONTINUE TO MONITOR CLINICALLY

## 2018-09-15 VITALS — HEART RATE: 69 BPM | TEMPERATURE: 97.4 F | SYSTOLIC BLOOD PRESSURE: 100 MMHG | DIASTOLIC BLOOD PRESSURE: 58 MMHG
